# Patient Record
Sex: FEMALE | Race: WHITE | NOT HISPANIC OR LATINO | Employment: UNEMPLOYED | URBAN - METROPOLITAN AREA
[De-identification: names, ages, dates, MRNs, and addresses within clinical notes are randomized per-mention and may not be internally consistent; named-entity substitution may affect disease eponyms.]

---

## 2023-02-17 ENCOUNTER — TELEPHONE (OUTPATIENT)
Dept: INTERNAL MEDICINE | Facility: CLINIC | Age: 28
End: 2023-02-17

## 2023-02-17 NOTE — TELEPHONE ENCOUNTER
Pt came in for an appt, but had to leave because she had an abscess in her mouth and was on a lot pain. I r/s the pts appt, but she left  Before being seen.  Just a INGRID

## 2024-06-22 ENCOUNTER — TELEPHONE (OUTPATIENT)
Dept: URGENT CARE | Facility: CLINIC | Age: 29
End: 2024-06-22

## 2024-06-22 NOTE — TELEPHONE ENCOUNTER
Attempted to call pt left message to call back.     Noticed that work note was accidentally given until 7/21 instead of 6/23. Need to have pt return to clinic to receive correct work noted.

## 2024-06-28 ENCOUNTER — PATIENT ROUNDING (BHMG ONLY) (OUTPATIENT)
Dept: ORTHOPEDIC SURGERY | Facility: CLINIC | Age: 29
End: 2024-06-28

## 2024-06-28 NOTE — PROGRESS NOTES
June 28, 2024    Hello, may I speak with Vickie HELTON?    My name is Kavon Tena      I am  with [facility]  [address] [place] [address] [address]  [facility] [id number].  I was calling to see how your visit went this week and if you had any feedback for how we could improve our clinic. Feel free to call us back at     Thank you, and have a great day.